# Patient Record
Sex: FEMALE | Race: WHITE
[De-identification: names, ages, dates, MRNs, and addresses within clinical notes are randomized per-mention and may not be internally consistent; named-entity substitution may affect disease eponyms.]

---

## 2019-07-16 ENCOUNTER — HOSPITAL ENCOUNTER (OUTPATIENT)
Dept: HOSPITAL 88 - OR | Age: 44
Discharge: HOME | End: 2019-07-16
Attending: INTERNAL MEDICINE
Payer: COMMERCIAL

## 2019-07-16 VITALS — SYSTOLIC BLOOD PRESSURE: 135 MMHG | DIASTOLIC BLOOD PRESSURE: 90 MMHG

## 2019-07-16 DIAGNOSIS — R74.0: ICD-10-CM

## 2019-07-16 DIAGNOSIS — Z87.891: ICD-10-CM

## 2019-07-16 DIAGNOSIS — Z88.2: ICD-10-CM

## 2019-07-16 DIAGNOSIS — E66.01: ICD-10-CM

## 2019-07-16 DIAGNOSIS — Z12.11: Primary | ICD-10-CM

## 2019-07-16 DIAGNOSIS — Z71.3: ICD-10-CM

## 2019-07-16 DIAGNOSIS — Z80.0: ICD-10-CM

## 2019-07-16 PROCEDURE — 81025 URINE PREGNANCY TEST: CPT

## 2019-07-16 PROCEDURE — 45378 DIAGNOSTIC COLONOSCOPY: CPT

## 2019-07-16 NOTE — XMS REPORT
[facility]

                             Created on: 2017



Aure Quinones

External Reference #: [id number]

: 1975

Sex: Female



Demographics







                          Address                   [address] [place] [address]  [address]

 

                          Home Phone                [phone]

 

                          Preferred Language        Unknown

 

                          Marital Status            Unknown

 

                          Cheondoism Affiliation     Unknown

 

                          Race                      Unknown

 

                          Ethnic Group              Non-





Author







                          Author                    Yoly Lanza

 

                          [facility]              eClinicalWorks

 

                          Address                   Unknown

 

                          Phone                     Unavailable







Care Team Providers







                    Care Team Member Name    Role                Phone

 

                    Yoly Lanza    CP                  Unavailable



                                                                



Allergies, Adverse Reactions, Alerts

          





                    Substance           Reaction            Event Type

 

                    sulfa               rash                Drug Allergy



                                                                                
       



Problems

          





             Problem Type    Condition    Code         Onset Dates    Condition Status

 

             Assessment    Other obesity due to excess calories    E66.09                    Active

 

             Problem      Elevated LFTs    R94.5                     Active

 

             Assessment    Acute pain of right shoulder    M25.511                   Active

 

             Assessment    Elevated BP without diagnosis of hypertension    R03.0                     Active

 

             Assessment    Body mass index (BMI) of 30.0-30.9 in adult    Z68.30                    Active

 

             Problem      Body mass index (BMI) of 30.0-30.9 in adult    Z68.30                    Active

 

             Problem      Mixed hyperlipidemia    E78.2                     Active

 

             Problem      Other obesity due to excess calories    E66.09                    Active

 

             Problem      ADHD (attention deficit hyperactivity disorder)    F90.9                     Active

 

             Problem      Hyperglycemia    R73.9                     Active

 

             Problem      Vitamin D deficiency    E55.9                     Active

 

             Problem      Depression    F32.9                     Active



                                                                                
                                                                                
                                    



Medications

          





        Medication    Code System    Code    Instructions    Start Date    End Date    Status    Dosage



 

           Naproxen    NDC        [address]-83    500 MG Orally every 12 hrs prn    2017    Aug

 10, 2017                 Active                    1 tablet

 

        Pristiq    NDC     [address]-48    50 MG Orally Once a day                    Active    1 tablet

 

        Lovaza    NDC     [address]-34    1 GM Orally Twice a day            Oct 05, 2017    Active    2 

capsules

 

        Zyrtec Allergy    NDC     [address]-64    10 MG Orally Once a day                    Active    1 tablet



 

        Vyvanse    NDC     [address]-10    10 MG Orally Once a day                    Active    1 capsule in

 the morning

 

          Fenofibrate    NDC       [address]-79    160 MG Orally Once a day    2017              Active

                                        1 tablet with a meal

 

             Scopolamine    NDC          [address]-40    1 MG/3DAYS Transdermal Q72 hours    2017

                    Oct 17, 2017        Active              1 patch to skin as needed

 

        Abilify    NDC     [address]-35    10 MG Orally Once a day                    Active    1 tablet

 

          Mirena    NDC       [address]-49    20 MCG/24HR Intrauterine     2016              Active 

                                        not defined



                                                                                
                                                                                
                



Vital Signs

          





                          Date/Time:                2017

 

                          BMI                       41.70 Index

 

                          Weight                    228 lbs

 

                          Height                    62 in

 

                          Cardiac Monitoring Heart Rate    65 /min

 

                          Blood Pressure Diastolic    86 mm Hg

 

                          Blood Pressure Systolic    142 mm Hg



                                                                              



Results

          No Known Results                                                      
             



Summary Purpose

          eClinicalWorks Submission

## 2019-07-16 NOTE — XMS REPORT
[facility]

                             Created on: 10/13/2016



Aure Quinones

External Reference #: [id number]

: 1975

Sex: Female



Demographics







                          Address                   [address] [place] [address]  [address]

 

                          Home Phone                [phone]

 

                          Preferred Language        Unknown

 

                          Marital Status            Unknown

 

                          Synagogue Affiliation     Unknown

 

                          Race                      Unknown

 

                          Ethnic Group              Non-





Author







                          Author                    Yoly Lanza

 

                          [facility]              eClinicalWorks

 

                          Address                   Unknown

 

                          Phone                     Unavailable







Care Team Providers







                    Care Team Member Name    Role                Phone

 

                    Yoly Lanza    CP                  Unavailable



                                                                



Allergies, Adverse Reactions, Alerts

          





                    Substance           Reaction            Event Type

 

                    sulfa               rash                Drug Allergy



                                                                    



Encounters

          





                    Encounter           Location            Date

 

                    Physical/FBW        [facility]    Aug 25,

 2016

 

                    1 month follow up     [facility]    Oct

 10, 2016



                                                                                
                 



Problems

          





             Problem Type    Condition    ICD-9 Code    Onset Dates    Condition Status

 

             Assessment    Morbid obesity due to excess calories    E66.01                    Active

 

             Assessment    Tendonitis of elbow, left    M77.8                     Active

 

             Assessment    BMI 40.0-44.9, adult    Z68.41                    Active

 

             Assessment    Mixed hyperlipidemia    E78.2                     Active

 

             Assessment    Vitamin D deficiency    E55.9                     Active

 

             Problem      Prediabetes    R73.09                    Active

 

             Problem      Hypothyroid    E03.9                     Active

 

             Problem      Mixed hyperlipidemia    E78.2                     Active

 

             Problem      Depression    F32.9                     Active

 

             Problem      ADHD (attention deficit hyperactivity disorder)    F90.9                     Active

 

             Problem      Hyperlipidemia    E78.5                     Active

 

             Problem      Vitamin D deficiency    E55.9                     Active



                                                                                
                                                                                
                                    



Medications

          





        Medication    Code System    Code    Instructions    Start Date    End Date    Status    Dosage



 

        Mirena    [facility]    [address]-64    20 MCG/24HR Intrauterine                     Active    Unknown



 

        V[facility]vanse    [facility]    [address]-10    10 MG Orally Once a day                    Active    1 capsule

 in the morning

 

          Lovaza    [facility]    [address]-40    1 GM Orally Twice a day              Oct 05, 2017    Active

                                        2 capsules

 

        Antara    [facility]    [address]-12    43 MG Orally Once a day                    Active    1 capsule

 with a meal

 

        Lexapro    [facility]    [address]-50    20 MG Orally Once a day                    Active    0.5 tablet



 

        Abilify    [facility]    [address]-54    10 MG Orally Once a day                    Active    1 tablet



 

                Mucinex DM Maximum Strength    [facility]        [address]-02     MG Orally Twice a 

day                                             Active          1 tablet as needed

 

        Mucinex    [facility]    [address]-23    600 MG Orally every 12 hrs                    Active    1 tablet

 as needed

 

        Zyrtec Allergy    [facility]    [address]-96    10 MG Orally Once a day                    Active    

1 tablet

 

                Vitamin D (Ergocalciferol)    [facility]        [address]-81    73648 UNIT Orally once per 

week            Aug 25, 2016    2017    Active          1 capsule



                                                                                
                                                                                
                



Social History

          





                    Social History Element    Qualifiers          Date Reported

 

                    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

 

                    children            .  1                Oct 10, 2016

 

                    Ethnicity           .  Status , Is english your primary language? Yes    Oct 10, 2016



 

                    Tobacco Use:        .  Are you a: never smoker    Oct 10, 2016

 

                    Flu Vaccine:        .  never, Refuse    Oct 10, 2016

 

                    Use of recreational / street drugs?    .  Answer: No       Oct 10, 2016

 

                    Do you have pets?    .  Status: Yes, Type: cat(s)    Oct 10, 2016

 

                    Where or with whom do you live ?    .  family           Oct 10, 2016

 

                    Marital Status:     .  Osmar    Oct 10, 2016

 

                    Caffeine intake?    .  Status: Yes, What type: Tea    Oct 10, 2016

 

                    Do you exercise?    .  Answer: Yes, Type: walking    Oct 10, 2016

 

                    Depression Screening:    .  positive         Oct 10, 2016

 

                    Smoke Exposure:     .  Second Hand Smoke Exposure: No    Oct 10, 2016

 

                    Do you drink alcohol?    .  Status: No       Oct 10, 2016



                                                                                
                                                                                
                                                                  



Vital Signs

          





                          Date/Time:                Oct 10, 2016

 

                          Weight                    235 lbs

 

                          Height                    62 in

 

                          Cardiac Monitoring Heart Rate    68 /min

 

                          Blood Pressure Diastolic    84 mm Hg

 

                          Blood Pressure Systolic    122 mm Hg



                                                                    



Summary Purpose

          eClinicalWorks Submission

## 2019-07-16 NOTE — XMS REPORT
[facility]

                             Created on: 2019



Aure Quinones

External Reference #: [id number]

: 1975

Sex: Female



Demographics







                          Address                   [address] [place] [address]  [address]

 

                          Home Phone                [phone]

 

                          Preferred Language        Unknown

 

                          Marital Status            Unknown

 

                          Sabianism Affiliation     Unknown

 

                          Race                      Unknown

 

                          Ethnic Group              UNK





Author







                          Author                    Yoly Lanza

 

                          [facility]              eClinicalWorks

 

                          Address                   Unknown

 

                          Phone                     Unavailable







Care Team Providers







                    Care Team Member Name    Role                Phone

 

                    Yoly Lanza    CP                  Unavailable



                                                                



Allergies, Adverse Reactions, Alerts

          





                    Substance           Reaction            Event Type

 

                    sulfa               hives               Drug Allergy



                                                                                
       



Problems

          





             Problem Type    Condition    Code         Onset Dates    Condition Status

 

             Problem      Elevated LFTs    R94.5                     Active

 

             Problem      Hyperglycemia    R73.9                     Active

 

             Problem      Other obesity due to excess calories    E66.09                    Active

 

             Problem      Body mass index (BMI) of 30.0-30.9 in adult    Z68.30                    Active

 

             Problem      BMI 45.0-49.9, adult    Z68.42                    Active

 

             Problem      Depression    F32.9                     Active

 

             Problem      ADHD (attention deficit hyperactivity disorder)    F90.9                     Active

 

             Problem      Mixed hyperlipidemia    E78.2                     Active

 

             Problem      Vitamin D deficiency    E55.9                     Active

 

             Assessment    Hyperglycemia    R73.9                     Active

 

             Assessment    Elevated LFTs    R94.5                     Active

 

             Assessment    Other obesity due to excess calories    E66.09                    Active

 

             Assessment    Snoring      R06.83                    Active

 

             Assessment    Vitamin D deficiency    E55.9                     Active



                                                                                
                                                                                
                                                        



Medications

          





        Medication    Code System    Code    Instructions    Start Date    End Date    Status    Dosage



 

        Naproxen    NDC     00641273896    500 mg Oral as needed (prn)                    Active    TAKE 1 TABLET

 BY MOUTH TWICE A DAY WITH MEALS

 

        Abilify    NDC     11120152622    10 MG Orally Once a day                    Active    1 tablet

 

             Vitamin D (Ergocalciferol)    NDC          02249938909    00232 UNIT Orally once per week    Feb

 19, 2019           Aug 18, 2019        Active              1 capsule

 

        Mirena    NDC     19792126237    20 MCG/24HR Intrauterine     2016            Active    not

 defined

 

          Fenofibrate    NDC       87188667730    160 MG Orally Once a day    2017              Active

                                        1 tablet with a meal

 

        Lovaza    ND     58409417709    1 GM Orally Twice a day            2020    Active    2 capsules



 

        Trintellix    NDC     96307430605    10 MG Orally Once a day                    Active    1 tablet

 

        Zyrtec Allergy    ND     87206395512    10 MG Orally Once a day                    Active    1 tablet





                                                                                
                                                                                
      



Vital Signs

          





                          Date/Time:                2019

 

                          BMI                       45.72 Index

 

                          Weight                    250 lbs

 

                          Height                    62 in

 

                          Cardiac Monitoring Heart Rate    70 /min

 

                          Blood Pressure Diastolic    80 mm Hg

 

                          Blood Pressure Systolic    122 mm Hg



                                                                              



Results

          No Known Results                                                      
             



Summary Purpose

          eClinicalWorks Submission

## 2019-07-16 NOTE — XMS REPORT
[facility]

                             Created on: 2019



Aure Quinones

External Reference #: [id number]

: 1975

Sex: Female



Demographics







                          Address                   [address] [place] [address]  [address]

 

                          Home Phone                [phone]

 

                          Preferred Language        Unknown

 

                          Marital Status            Unknown

 

                          Church Affiliation     Unknown

 

                          Race                      Unknown

 

                          Ethnic Group              UNK





Author







                          Author                    Yoly Lanza

 

                          Organization              [employer]

 

                          Address                   Unknown

 

                          Phone                     Unavailable







Care Team Providers







                    Care Team Member Name    Role                Phone

 

                    Yoly Lanza    CP                  Unavailable



                                                                



Allergies

          No Known Allergies                                                    
                                   



Problems

          





             Problem Type    Condition    Code         Onset Dates    Condition Status

 

             Problem      Elevated LFTs    R94.5                     Active

 

             Problem      ADHD (attention deficit hyperactivity disorder)    F90.9                     Active

 

             Problem      Hyperglycemia    R73.9                     Active

 

             Assessment    Elevated LFTs    R94.5                     Active

 

             Problem      BMI 45.0-49.9, adult    Z68.42                    Active

 

             Problem      Other obesity due to excess calories    E66.09                    Active

 

             Problem      Morbid obesity    E66.01                    Active

 

             Problem      Vitamin D deficiency    E55.9                     Active

 

             Problem      Depression    F32.9                     Active

 

             Problem      Body mass index (BMI) of 30.0-30.9 in adult    Z68.30                    Active

 

             Problem      Mixed hyperlipidemia    E78.2                     Active



                                                                                
                                                                                
                          



Medications

          No Known Medications                                                  
                           



Results

          No Known Results                                                      
             



Summary Purpose

          eClinicalWorks Submission

## 2019-07-16 NOTE — XMS REPORT
[facility]

                             Created on: 2017



Aure Quinones

External Reference #: [id number]

: 1975

Sex: Female



Demographics







                          Address                   [address] [place] [address]  [address]

 

                          Home Phone                [phone]

 

                          Preferred Language        Unknown

 

                          Marital Status            Unknown

 

                          Muslim Affiliation     Unknown

 

                          Race                      Unknown

 

                          Ethnic Group              Non-





Author







                          Author                    Yoly Lanza

 

                          [facility]              eClinicalWorks

 

                          Address                   Unknown

 

                          Phone                     Unavailable







Care Team Providers







                    Care Team Member Name    Role                Phone

 

                    Yloy Lanza    CP                  Unavailable



                                            



Encounters

          





                    Encounter           Location            Date

 

                    Unknown             [facility]    2016



 

                    3 MONTH FOLLOW UP    [facility]    [place]

 10, 2017

 

                    Test results        [facility]    2017

 

                    Physical/FBW        [facility]    Aug 25,

 2016

 

                    1 month follow up     [facility]    Oct

 10, 2016

 

                    Unknown             [facility]    2016





                                                                                
                                                         



Problems

          





             Problem Type    Condition    ICD-9 Code    Onset Dates    Condition Status

 

             Problem      Prediabetes    R73.09                    Active

 

             Problem      Hypothyroid    E03.9                     Active

 

             Problem      Mixed hyperlipidemia    E78.2                     Active

 

             Problem      ADHD (attention deficit hyperactivity disorder)    F90.9                     Active

 

             Problem      Vitamin D deficiency    E55.9                     Active

 

             Problem      Depression    F32.9                     Active



                                                                                
                                                         



Medications

          





        Medication    Code System    Code    Instructions    Start Date    End Date    Status    Dosage



 

           [facility]    [address]-33    145 MG Orally Once a day    2017      

                          Active                    1 tablet



                                                                                
       



Social History

          





                    Social History Element    Qualifiers          Date Reported

 

                    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

 

                    children            .  1                [place] 10, 2017

 

                    Ethnicity           .  Status , Is english your primary language? Yes    [place] 10, 2017



 

                    Tobacco Use:        .  Are you a: never smoker    [place] 10, 2017

 

                    Flu Vaccine:        .  never, Refuse    [place] 10, 2017

 

                    Use of recreational / street drugs?    .  Answer: No       [place] 10, 2017

 

                    Do you have pets?    .  Status: Yes, Type: cat(s)    [place] 10, 2017

 

                    Where or with whom do you live ?    .  family           [place] 10, 2017

 

                    Marital Status:     .  Osmar    [place] 10, 2017

 

                    Caffeine intake?    .  Status: Yes, What type: Tea    [place] 10, 2017

 

                    Do you exercise?    .  Answer: Yes, Type: walking    [place] 10, 2017

 

                    Smoke Exposure:     .  Second Hand Smoke Exposure: No    [place] 10, 2017

 

                    Do you drink alcohol?    .  Status: No       [place] 10, 2017



                                                                                
                                                                                
                          



Summary Purpose

          eClinicalWorks Submission

## 2019-07-16 NOTE — XMS REPORT
[facility]

                             Created on: 2016



Joni Aure

External Reference #: [id number]

: 1975

Sex: Female



Demographics







                          Address                   [address] [place] [address]  [address]

 

                          Home Phone                [phone]

 

                          Preferred Language        Unknown

 

                          Marital Status            Unknown

 

                          Advent Affiliation     Unknown

 

                          Race                      Unknown

 

                          Ethnic Group              Non-





Author







                          Author                    Yoly Lanza

 

                          [facility]              eClinicalWorks

 

                          Address                   Unknown

 

                          Phone                     Unavailable







Care Team Providers







                    Care Team Member Name    Role                Phone

 

                    Pak Yoly Delgado    CP                  Unavailable



                                            



Encounters

          





                    Encounter           Location            Date

 

                    Physical/FBW        [facility]    Aug 25,

 2016

 

                    1 month follow up     [facility]    Oct

 10, 2016

 

                    Unknown             [facility]    2016





                                                                                
                           



Problems

          





             Problem Type    Condition    ICD-9 Code    Onset Dates    Condition Status

 

             Assessment    Mixed hyperlipidemia    E78.2                     Active

 

             Problem      Prediabetes    R73.09                    Active

 

             Problem      Hypothyroid    E03.9                     Active

 

             Problem      Mixed hyperlipidemia    E78.2                     Active

 

             Problem      Depression    F32.9                     Active

 

             Problem      ADHD (attention deficit hyperactivity disorder)    F90.9                     Active

 

             Problem      Hyperlipidemia    E78.5                     Active

 

             Problem      Vitamin D deficiency    E55.9                     Active



                                                                                
                                                                             



Medications

          





        Medication    Code System    Code    Instructions    Start Date    End Date    Status    Dosage



 

           Fenofibrate    "[employer]SPAN    [address]-47    145 MG Orally Once a day    2016      

                          Active                    1 tablet

 

        Antara    [facility]    [address]-44    43 MG Orally Once a day                    Inactive    1 capsule

 with a meal



                                                                                
                 



Social History

          





                    Social History Element    Qualifiers          Date Reported

 

                    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

 

                    children            .  1                Oct 10, 2016

 

                    Ethnicity           .  Status , Is english your primary language? Yes    Oct 10, 2016



 

                    Tobacco Use:        .  Are you a: never smoker    Oct 10, 2016

 

                    Flu Vaccine:        .  never, Refuse    Oct 10, 2016

 

                    Use of recreational / street drugs?    .  Answer: No       Oct 10, 2016

 

                    Do you have pets?    .  Status: Yes, Type: cat(s)    Oct 10, 2016

 

                    Where or with whom do you live ?    .  family           Oct 10, 2016

 

                    Marital Status:     .  Osmar    Oct 10, 2016

 

                    Caffeine intake?    .  Status: Yes, What type: Tea    Oct 10, 2016

 

                    Do you exercise?    .  Answer: Yes, Type: walking    Oct 10, 2016

 

                    Depression Screening:    .  positive         Oct 10, 2016

 

                    Smoke Exposure:     .  Second Hand Smoke Exposure: No    Oct 10, 2016

 

                    Do you drink alcohol?    .  Status: No       Oct 10, 2016



                                                                                
                                                                                
                                    



Summary Purpose

          eClinicalWorks Submission

## 2019-07-16 NOTE — XMS REPORT
[facility]

                             Created on: 2017



Aure Quinones

External Reference #: [id number]

: 1975

Sex: Female



Demographics







                          Address                   [address] [place] [address]  [address]

 

                          Home Phone                [phone]

 

                          Preferred Language        Unknown

 

                          Marital Status            Unknown

 

                          Religion Affiliation     Unknown

 

                          Race                      Unknown

 

                          Ethnic Group              Non-





Author







                          Author                    Akanksha Gordon

 

                          [facility]              eClinicalWorks

 

                          Address                   Unknown

 

                          Phone                     Unavailable







Care Team Providers







                    Care Team Member Name    Role                Phone

 

                    Akanksha Gordon    CP                  Unavailable



                                                                



Allergies, Adverse Reactions, Alerts

          





                    Substance           Reaction            Event Type

 

                    sulfa               rash                Drug Allergy



                                                                                
       



Problems

          





             Problem Type    Condition    Code         Onset Dates    Condition Status

 

             Assessment    Depression    F32.9                     Active

 

             Assessment    Vitamin D deficiency    E55.9                     Active

 

             Assessment    ADHD (attention deficit hyperactivity disorder)    F90.9                     Active

 

             Assessment    Motion sickness, subsequent encounter    T75.3XXD                  Active

 

             Problem      Hypothyroid    E03.9                     Active

 

             Problem      Vitamin D deficiency    E55.9                     Active

 

             Problem      Mixed hyperlipidemia    E78.2                     Active

 

             Assessment    Mixed hyperlipidemia    E78.2                     Active

 

             Assessment    Hypothyroid    E03.9                     Active

 

             Problem      Depression    F32.9                     Active

 

             Problem      ADHD (attention deficit hyperactivity disorder)    F90.9                     Active



                                                                                
                                                                                
                          



Medications

          





        Medication    Code System    Code    Instructions    Start Date    End Date    Status    Dosage



 

        Zyrtec Allergy    NDC     [address]-91    10 MG Orally Once a day                    Active    1 tablet



 

          Fenofibrate    NDC       [address]-14    160 MG Orally Once a day    2017              Active

                                        1 tablet with a meal

 

             Scopolamine    NDC          [address]-26    1 MG/3DAYS Transdermal Q72 hours           Active              1 patch to skin as needed

 

        Vyvanse    NDC     [address]-10    10 MG Orally Once a day                    Active    1 capsule in

 the morning

 

        Mirena    NDC     [address]-72    20 MCG/24HR Intrauterine                     Active    not defined



 

                Vitamin D (Ergocalciferol)    NDC             [address]-21    89507 UNIT Orally once per week 

                Aug 25, 2016    2017    Active          1 capsule

 

        Pristiq    NDC     [address]-48    50 MG Orally Once a day                    Active    1 tablet

 

        Lovaza    NDC     [address]-58    1 GM Orally Twice a day            Oct 05, 2017    Active    2 

capsules

 

        Abilify    NDC     [address]-97    10 MG Orally Once a day                    Active    1 tablet



                                                                                
                                                                                
                



Vital Signs

          





                          Date/Time:                2017

 

                          BMI                       42.06 Index

 

                          Weight                    230 lbs

 

                          Height                    62 in

 

                          Cardiac Monitoring Heart Rate    76 /min

 

                          Blood Pressure Diastolic    70 mm Hg

 

                          Blood Pressure Systolic    120 mm Hg



                                                                              



Results

          No Known Results                                                      
             



Summary Purpose

          eClinicalWorks Submission

## 2019-07-16 NOTE — XMS REPORT
[facility]

                             Created on: 11/10/2016



Aure Quinones

External Reference #: [id number]

: 1975

Sex: Female



Demographics







                          Address                   [address] [place] [address]  [address]

 

                          Home Phone                [phone]

 

                          Preferred Language        Unknown

 

                          Marital Status            Unknown

 

                          Sabianist Affiliation     Unknown

 

                          Race                      Unknown

 

                          Ethnic Group              Non-





Author







                          Author                    Yoly Lanza

 

                          [facility]              eClinicalWorks

 

                          Address                   Unknown

 

                          Phone                     Unavailable







Care Team Providers







                    Care Team Member Name    Role                Phone

 

                    Yoly Lanza    CP                  Unavailable



                                            



Encounters

          





                    Encounter           Location            Date

 

                    Unknown             [facility]    2016



 

                    Physical/FBW        [facility]    Aug 25,

 2016

 

                    1 month follow up     [facility]    Oct

 10, 2016

 

                    Unknown             [facility]    2016





                                                                                
                                     



Problems

          





             Problem Type    Condition    ICD-9 Code    Onset Dates    Condition Status

 

             Problem      Prediabetes    R73.09                    Active

 

             Problem      Hypothyroid    E03.9                     Active

 

             Problem      Mixed hyperlipidemia    E78.2                     Active

 

             Problem      Depression    F32.9                     Active

 

             Problem      ADHD (attention deficit hyperactivity disorder)    F90.9                     Active

 

             Problem      Hyperlipidemia    E78.5                     Active

 

             Problem      Vitamin D deficiency    E55.9                     Active



                                                                                
                                                                   



Medications

          





        Medication    Code System    Code    Instructions    Start Date    End Date    Status    Dosage



 

          Trilipix    MED[facility]    [address]-99    135 MG Orally Once a day    Nov 10, 2016              Active

                                        1 capsule

 

           Fenofibrate    [facility]    [address]-43    145 MG Orally Once a day    2016      

                          Inactive                  1 tablet



                                                                                
                 



Social History

          





                    Social History Element    Qualifiers          Date Reported

 

                    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

 

                    children            .  1                Oct 10, 2016

 

                    Ethnicity           .  Status , Is english your primary language? Yes    Oct 10, 2016



 

                    Tobacco Use:        .  Are you a: never smoker    Oct 10, 2016

 

                    Flu Vaccine:        .  never, Refuse    Oct 10, 2016

 

                    Use of recreational / street drugs?    .  Answer: No       Oct 10, 2016

 

                    Do you have pets?    .  Status: Yes, Type: cat(s)    Oct 10, 2016

 

                    Where or with whom do you live ?    .  family           Oct 10, 2016

 

                    Marital Status:     .  Osmar    Oct 10, 2016

 

                    Caffeine intake?    .  Status: Yes, What type: Tea    Oct 10, 2016

 

                    Do you exercise?    .  Answer: Yes, Type: walking    Oct 10, 2016

 

                    Depression Screening:    .  positive         Oct 10, 2016

 

                    Smoke Exposure:     .  Second Hand Smoke Exposure: No    Oct 10, 2016

 

                    Do you drink alcohol?    .  Status: No       Oct 10, 2016



                                                                                
                                                                                
                                    



Summary Purpose

          eClinicalWorks Submission

## 2019-07-16 NOTE — OPERATIVE REPORT
DATE OF PROCEDURE:  

 

SURGEON:  Virgilio Mcmahon MD

 

PROCEDURE:  Colonoscopy.

 

INDICATIONS:  The patient with a family history of colon cancer, here for colon cancer

screening. 

 

DESCRIPTION OF PROCEDURE:  After informed and written consent, premedications with

monitored anesthesia care, standard adult video colonoscope was introduced into the

rectum and all the way into the cecum and into the terminal ileum.  The terminal ileum,

cecum, appendiceal orifice, and ileocecal valve all appeared to be normal.  Ascending

colon, transverse, descending, sigmoid, and rectum appeared to be unremarkable.

Retroflexion of the rectum was also normal. 

 

IMPRESSION:  Normal colonoscopy.

 

RECOMMENDATIONS:  Repeat colonoscopy in 5 years.  Followup with our office as needed.

 

 

 

 

______________________________

Virgilio Mcmahon MD SR/MODL

D:  07/16/2019 07:35:49

T:  07/16/2019 10:43:38

Job #:  [id number]

 

cc:            Yoly Phillips DO

## 2019-07-16 NOTE — XMS REPORT
[facility]

                             Created on: 2017



Aure Quinones

External Reference #: [id number]

: 1975

Sex: Female



Demographics







                          Address                   [address] [place] [address]  [id number]

 

                          Home Phone                [phone]

 

                          Preferred Language        Unknown

 

                          Marital Status            Unknown

 

                          [employer] Affiliation     Unknown

 

                          Race                      Unknown

 

                          Ethnic Group              Non-





Author







                          Author                    Akanksha Gordon

 

                          Organization              [employer]

 

                          Address                   Unknown

 

                          Phone                     Unavailable







Care Team Providers







                    Care Team Member Name    Role                Phone

 

                    Akanksha Gordon    CP                  Unavailable



                                                                



Allergies, Adverse Reactions, Alerts

          





                    Substance           Reaction            Event Type

 

                    sulfa               rash                Drug Allergy



                                                                                
       



Problems

          





             Problem Type    Condition    Code         Onset Dates    Condition Status

 

             Assessment    Mixed hyperlipidemia    E78.2                     Active

 

             Assessment    Hyperglycemia    R73.9                     Active

 

             Assessment    Vitamin D deficiency    E55.9                     Active

 

             Assessment    Motion sickness, subsequent encounter    T75.3XXD                  Active

 

             Assessment    Elevated BP without diagnosis of hypertension    R03.0                     Active

 

             Problem      Vitamin D deficiency    E55.9                     Active

 

             Problem      Depression    F32.9                     Active

 

             Problem      Mixed hyperlipidemia    E78.2                     Active

 

             Problem      Elevated LFTs    R94.5                     Active

 

             Assessment    Elevated LFTs    R94.5                     Active

 

             Problem      ADHD (attention deficit hyperactivity disorder)    F90.9                     Active

 

             Problem      Hyperglycemia    R73.9                     Active



                                                                                
                                                                                
                                    



Medications

          





        Medication    Code System    Code    Instructions    Start Date    End Date    Status    Dosage



 

        Abilify    NDC     [address]-66    10 MG Orally Once a day                    Active    1 tablet

 

          Fenofibrate    NDC       [address]-05    160 MG Orally Once a day    2017              Active

                                        1 tablet with a meal

 

        Vyvanse    NDC     [address]-10    10 MG Orally Once a day                    Active    1 capsule in

 the morning

 

        Zyrtec Allergy    NDC     [address]-36    10 MG Orally Once a day                    Active    1 tablet



 

        Lovaza    NDC     [address]-77    1 GM Orally Twice a day            Oct 05, 2017    Active    2 

capsules

 

        Mirena    NDC     [address]-45    20 MCG/24HR Intrauterine                     Active    not defined



 

        Pristiq    NDC     [address]-75    50 MG Orally Once a day                    Active    1 tablet

 

             Scopolamine    NDC          [address]-01    1 MG/3DAYS Transdermal Q72 hours    2017

                    Oct 17, 2017        Active              1 patch to skin as needed



                                                                                
                                                                                
      



Vital Signs

          





                          Date/Time:                2017

 

                          BMI                       41.70 Index

 

                          Weight                    228 lbs

 

                          Height                    62 in

 

                          Cardiac Monitoring Heart Rate    102 /min

 

                          Blood Pressure Diastolic    90 mm Hg

 

                          Blood Pressure Systolic    140 mm Hg



                                                                              



Results

          No Known Results                                                      
             



Summary Purpose

          [employer] Submission

## 2019-07-16 NOTE — XMS REPORT
Continuity of Care Document

                             Created on: 2019



MIRI FAJARDO

External Reference #: [id number]

: 1975

Sex: Female



Demographics







                          Address                   [address] [place] [address]  [id number]

 

                          Home Phone                [phone]

 

                          Preferred Language        English

 

                          Marital Status            Unknown

 

                          Church Affiliation     Unknown

 

                          Race                      Unknown

 

                          Ethnic Group              Unknown





Author







                          Author                    [facility]

 

                          Organization              [facility]

 

                          Address                   Unknown

 

                          Phone                     Unavailable







Care Team Providers







                    Care Team Member Name    Role                Phone

 

                    [employer]    Unavailable         Unavailable



                                    



Problems

                    





                    Problem                            Status                            Onset Date     

                          Classification                            Date Reported       

                          Comments                            Source                    

 

                    Depression                            Active                                        

                          Problem                            2019                        

                                                      [facility]                

    

 

                    Vitamin D deficiency                            Active                              

                          Problem                            2019              

                                                      [facility]      

              

 

                    ADHD                            Active                                              

                    Problem                            2019                                

                                        [facility]                    

 

                          Motion sickness, subsequent encounter                            Active           

                                                Diagnosis                            2017

                                                        [facility]                    

 

                    Hypothyroid                            Active                                       

                          Problem                            2017                       

                                                      [facility]               

     

 

                    Mixed hyperlipidemia                            Active                              

                          Problem                            2019              

                                                      [facility]      

              

 

                          Other obesity due to excess calories                            Active            

                                                Problem                            2019

                                                        [facility]                    

 

                    Elevated LFTs                            Active                                     

                          Problem                            2019                     

                                                      [facility]             

       

 

                          Acute pain of right shoulder                            Active                    

                                                Diagnosis                            2017    

                                                      [facility]

                    

 

                          Elevated BP without diagnosis of hypertension                            Active   

                                                      Diagnosis                       

                    2017                                                        [facility]                    

 

                          Body mass index of 30.0-30.9 in adult                            Active           

                                                Problem                            2019

                                                        [facility]                    

 

                    Hyperglycemia                            Active                                     

                          Problem                            2019                     

                                                      [facility]             

       

 

                    BMI 45.0-49.9, adult                            Active                              

                          Problem                            2019              

                                                      [facility]      

              

 

                    Morbid obesity                            Active                                    

                          Problem                            2019                    

                                                      [facility]            

        

 

                    Snoring                            Active                                           

                          Diagnosis                            2019                         

                                                      [facility]                 

   

 

                                        Routine general medical examination at a health care facility                   

                    Active                                                        Diagnosis       

                     2019                                                        

[facility]                    

 

                    Prediabetes                            Active                                       

                          Problem                            2017                       

                                                      [facility]               

     

 

                    Hyperlipidemia                            Active                                    

                          Problem                            2016                    

                                                      [facility]            

        

 

                          Morbid obesity due to excess calories                            Active           

                                                Diagnosis                            10/14/2016

                                                        [facility]                    

 

                          Tendonitis of elbow, left                            Active                       

                                                Diagnosis                            10/14/2016       

                                                      [facility]

                    

 

                    Severe obesity                            Active                                    

                          Diagnosis                            2016                  

                                                      [facility]          

          



                                                                                
                                                                                
                                                                                
                                                                                
                                                                    



Medications

                    





                    Medication                            Details                            Route      

                          Status                            Patient Instructions         

                          Ordering Provider                            Order Date           

                                        Source                    

 

                    Lovaza                            2 capsules                            Orally      

                          Active                            1 GM Orally Twice a day      

                          Pak Delgado                            2020             

                                        [facility]                    

 

                          Vitamin D (Ergocalciferol)                            1 capsule                   

                    Orally                            Active                            44082 UNIT

 Orally once per week                            Pak Delgado                      

                          2019                            [facility]    

                

 

                    Lovaza                            2 capsules                            Orally      

                          Active                            1 GM Orally Twice a day      

                          Pak Delgado                            10/05/2017             

                                        [facility]                    

 

                    Naproxen                            1 tablet                            Orally      

                          Active                            500 MG Orally every 12 hrs prn

                            Pak Delgado                            2017       

                                        [facility]                    

 

                          Scopolamine                            1 patch to skin as needed                  

                    Transdermal                            Active                            1 MG/3DAYS

 Transdermal Q72 hours                            Pak Delgado                     

                          2017                            [facility]   

                 

 

                          Fenofibrate                            1 tablet with a meal                       

                    Orally                            Active                            160 MG Orally 

Once a day                            Pak Delgado                            2017

                                        [facility]                  

  

 

                          Fenofibrate                            1 tablet with a meal                       

                    Orally                            Active                            160 MG Orally 

Once a day                            Pak Delgado                            2017

                                        [facility]                  

  

 

                    Fenofibrate                            1 tablet                            Orally   

                          No Longer Active                            145 MG Orally Once

 a day                            Pak Delgado                            2017 

                                        [facility]                   

 

 

                    Trilipix                            1 capsule                            Orally     

                          Active                            135 MG Orally Once a day    

                          Pak Delgado                            11/10/2016           

                                        [facility]                    

 

                    Fenofibrate                            1 tablet                            Orally   

                          No Longer Active                            145 MG Orally Once

 a day                            Pak Delgado                            2016 

                                        [facility]                   

 

 

                    Mirena                            not defined                            Intrauterine

                            Active                            20 MCG/24HR Intrauterine

                            Pak Delgado                            2016       

                                        [facility]                    

 

                    Mirena                            not defined                            Intrauterine

                            Active                            20 MCG/24HR Intrauterine

                            Pak Delgado                            2016       

                                        [facility]                    

 

                          Vitamin D (Ergocalciferol)                            1 capsule                   

                    Orally                            Active                            72525 UNIT

 Orally once per week                            Evan                            2016

                                        [facility]                  

  

 

                    Zyrtec Allergy                            1 tablet                            Orally

                            Active                            10 MG Orally Once a day

                            Pak Delgado                                             

                                        [facility]                    

 

                          Vyvanse                            1 capsule in the morning                       

                    Orally                            Active                            10 MG Orally Once

 a day                            Pak Delgado                                       

                                        [facility]                    

 

                    Mirena                            not defined                            Intrauterine

                            Active                            20 MCG/24HR Intrauterine

                            Evan                                                  

                                        [facility]                    

 

                    Pristiq                            1 tablet                            Orally       

                          Active                            50 MG Orally Once a day       

                          Pak Delgado                                                    

                                        [facility]                    

 

                    Abilify                            1 tablet                            Orally       

                          Active                            10 MG Orally Once a day       

                          Pasha Delgado                                                    

                                        [facility]                    

 

                    Trintellix                            1 tablet                            Orally    

                          Active                            10 MG Orally Once a day    

                          P[facility] Delgado                                                 

                                        [facility]                    

 

                    Abilify                            1 tablet                            Orally       

                          Active                            10 MG Orally Once a day       

                          [facility]                    

 

                          Naproxen                            TAKE 1 TABLET BY MOUTH TWICE A DAY WITH MEALS 

                           Oral                            Active                    

                          500 mg Oral as needed (prn)                            [facility]  

                  

 

                    Zyrtec Allergy                            1 tablet                            Orally

                            Active                            10 MG Orally Once a day

                            [facility]                    

 

                    Pristiq                            1 tablet                            Orally       

                          Active                            50 MG Orally Once a day       

                          [facility]                    

 

                          Vyvanse                            1 capsule in the morning                       

                    Orally                            Active                            10 MG Orally Once

 a day                            [facility]                    

 

                    Antara                            1 capsule with a meal                            Orally

                            No Longer Active                            43 MG Orally 

Once a day                            [facility]                    

 

                    Lexapro                            0.5 tablet                            Orally     

                          Active                            20 MG Orally Once a day     

                          [facility]                    

 

                          Mucinex DM Maximum Strength                            1 tablet as needed         

                    Orally                            Active                            

 MG Orally Twice a day                            [facility]      

              

 

                    Mucinex                            1 tablet as needed                            Orally

                            No Longer Active                            600 MG Orally

 every 12 hrs                            [facility]                    

 

                    Lovaza                            2 capsules                            Orally      

                          Active                            1 GM Orally Twice a day      

                          [facility]                                                   

                                        [facility]                    



                                                                                
                                                                                
                                                                                
                                                                                
                                                                                
                                                                                
                                              



Allergies, Adverse Reactions, Alerts

                    





                    Substance                            Category                            Reaction   

                          Severity                            Reaction type           

                          Status                            Date Reported                     

                          Comments                            Source                    

 

                    sulfa                            Adverse Reaction                            hives  

                                                      Adverse Reaction               

                    Active                            2019                              

                                        [facility]                    





                                                        



Immunizations

                    





                    Immunization                            Date Given                            Site  

                          Status                            Last Updated             

                          Comments                            Source                    

 

                          FLU 18 YRS & UP [address]                            2016                       

                                                completed                                             

                                                      [facility]         

           



                                             



Results







                                        No Data Provided for This Section



                    



Pathology Reports







                                        No Data Provided for This Section                    



                            



Diagnostic Reports

            





                                        No Data Provided for This Section                    



                                                            



Consultation Notes

                    





                                        No Data Provided for This Section                    



                                                            



Discharge Summaries

                    





                                        No Data Provided for This Section                    



                                                            



History and Physicals

                    





                                        No Data Provided for This Section                    



                                                                



Vital Signs

                     





                    Vital Sign                            Value                            Date         

                          Comments                            Source                    

 

                    Weight                            230                             2019        

                                                      [facility]

                    

 

                    Height                            62                             2019         

                                                      [facility] 

                   

 

                    Heart Rate                            68                             2019     

                                                      [facility]

                    

 

                    Diastolic (mm Hg)                            80                             2019

                                                        [facility]                    

 

                    Systolic (mm Hg)                            126                             2019

                                                        [facility] [facility]                    

 

                    Weight                            250                             2019        

                                                      [facility]

                    

 

                    Height                            62                             2019         

                                                      [facility] 

                   

 

                    Heart Rate                            70                             2019     

                                                      [facility]

                    

 

                    Diastolic (mm Hg)                            80                             2019

                                                        [facility]                    

 

                    Systolic (mm Hg)                            122                             2019

                                                        [facility]                    

 

                    Weight                            252                             2019        

                                                      [facility]

                    

 

                    Height                            62                             2019         

                                                      [facility] 

                   

 

                    Heart Rate                            68                             2019     

                                                      [facility]

                    

 

                    Diastolic (mm Hg)                            80                             2019

                                                        [facility]                    

 

                    Systolic (mm Hg)                            124                             2019

                                                        [facility]                    

 

                    Weight                            228                             2017        

                                                      [facility]

                    

 

                    Height                            62                             2017         

                                                      [facility] 

                   

 

                    Heart Rate                            65                             2017     

                                                      [facility]

                    

 

                    Diastolic (mm Hg)                            86                             2017

                                                        [facility]                    

 

                    Systolic (mm Hg)                            142                             2017

                                                        [facility]                    

 

                    Weight                            228                             2017        

                                                      [facility]

                    

 

                    Height                            62                             2017         

                                                      [facility] 

                   

 

                    Heart Rate                            102                             2017    

                                                      [facility]

                    

 

                    Diastolic (mm Hg)                            90                             2017

                                                        [facility]                    

 

                    Systolic (mm Hg)                            140                             2017

                                                        [facility]                    

 

                    Weight                            230                             2017        

                                                      [facility]

                    

 

                    Height                            62                             2017         

                                                      [facility] 

                   

 

                    Heart Rate                            76                             2017     

                                                      [facility]

                    

 

                    Diastolic (mm Hg)                            70                             2017

                                                        [facility]                    

 

                    Systolic (mm Hg)                            120                             2017

                                                        [facility]                    

 

                    Weight                            230                             01/10/2017        

                                                      [facility]

                    

 

                    Height                            62                             01/10/2017         

                                                      [facility] 

                   

 

                    Heart Rate                            63                             01/10/2017     

                                                      [facility]

                    

 

                    Diastolic (mm Hg)                            84                             01/10/2017

                                                        [facility]                    

 

                    Systolic (mm Hg)                            118                             01/10/2017

                                                        [facility]                    

 

                    Weight                            235                             10/10/2016        

                                                      [facility]

                    

 

                    Height                            62                             10/10/2016         

                                                      [facility] 

                   

 

                    Heart Rate                            68                             10/10/2016     

                                                      [facility]

                    

 

                    Diastolic (mm Hg)                            84                             10/10/2016

                                                        [facility]                    

 

                    Systolic (mm Hg)                            122                             10/10/2016

                                                        [facility]                    

 

                    Weight                            235                             2016        

                                                      [facility]

                    

 

                    Height                            62                             2016         

                                                      [facility] 

                   

 

                    Heart Rate                            74                             2016     

                                                      [facility]

                    

 

                    Diastolic (mm Hg)                            82                             2016

                                                        [facility]                    

 

                    Systolic (mm Hg)                            129                             2016

                                                        [facility]                    



                                                                                
                                                                                
                                                                                
                                                                                
                                                                                
                                                                                
                                                                                
                                                                                
                                                                                
                                                               



Encounters

                    





                    Location                            Location Details                            Encounter

 Type                            Encounter Number                            Reason For

 Visit                            Attending Provider                            ADM Date

                            DC Date                            Status                

                                        Source                    

 

                                        [facility]                       

                                                Physical/FBW                            [id number]

                                                                                      2016                                               

                                        [facility]                    

 

                                        [facility]                       

                                                Physical/FBW                            [id number]

                                                                                      2016                                               

                                        [facility]                    

 

                                        [facility]                       

                                                Physical/FBW                            [id number]

                                                                                      2016                                               

                                        [facility]                    

 

                                        [facility]                       

                                                Physical/FBW                            [id number]

                                                                                      2016                                               

                                        [facility]                    

 

                                        [facility]                       

                                                Physical/FBW                            [id number]

                                                                                      2016                                               

                                        [facility]                    

 

                                        [facility]                       

                                                Physical/FBW                            [id number]

                                                                                      2016                                               

                                        [facility]                    

 

                                        [facility]                       

                                                Physical/FBW                            [id number]

                                                                                      2016                                               

                                        [facility]                    

 

                                        [facility]                       

                                                1 month follow up                            [id number]

                                                                                      10/10/2016

                            10/10/2016                                               

                                        [facility]                    

 

                                        [facility]                       

                                                1 month follow up                            [id number]

                                                                                      10/10/2016

                            10/10/2016                                               

                                        [facility]                    

 

                                        [facility]                       

                                                1 month follow up                            [id number]

                                                                                      10/10/2016

                            10/10/2016                                               

                                        [facility]                    

 

                                        [facility]                       

                                                1 month follow up                            [id number]

                                                                                      10/10/2016

                            10/10/2016                                               

                                        [facility]                    

 

                                        [facility]                       

                                                1 month follow up                            [id number]

                                                                                      10/10/2016

                            10/10/2016                                               

                                        [facility]                    

 

                                        [facility]                       

                                                1 month follow up                            [id number]

                                                                                      10/10/2016

                            10/10/2016                                               

                                        [facility]                    

 

                                        [facility]                       

                                                Unknown                            [id number]

                                                                                      2016                                               

                                        [facility]                    

 

                                        [facility]                       

                                                Unknown                            [id number]

                                                                                      2016                                               

                                        [facility]                    

 

                                        [facility]                       

                                                Unknown                            [id number]

                                                                                      2016                                               

                                        [facility]                    

 

                                        [facility]                       

                                                Unknown                            [id number]

                                                                                      2016                                               

                                        [facility]                    

 

                                        [facility]                       

                                                Unknown                            [id number]

                                                                                      2016                                               

                                        [facility]                    

 

                                        [facility]                       

                                                Unknown                            [id number]

                                                                                      2016                                               

                                        [facility]                    

 

                                        [facility]                       

                                                Unknown                            [id number]

                                                                                      2016                                               

                                        [facility]                    

 

                                        [facility]                       

                                                Unknown                            [id number]

                                                                                      2016                                               

                                        [facility]                    

 

                                        [facility]                       

                                                Unknown                            [id number]

                                                                                      2016                                               

                                        [facility]                    

 

                                        [facility]                       

                                                3 MONTH FOLLOW UP                            [id number]

                                                                                      01/10/2017

                            01/10/2017                                               

                                        [facility]                    

 

                                        [facility]                       

                                                3 MONTH FOLLOW UP                            [id number]

                                                                                      01/10/2017

                            01/10/2017                                               

                                        [facility]                    

 

                                        [facility]                       

                                                3 MONTH FOLLOW UP                            [id number]

                                                                                      01/10/2017

                            01/10/2017                                               

                                        [facility]                    

 

                                        [facility]                       

                                                Test results                            [id number]

                                                                                      2017                                               

                                        [facility]                    

 

                                        [facility]                       

                                                Test results                            [id number]

                                                                                      2017                                               

                                        [facility]                    

 

                                        [facility]                       

                                                Unknown                            [id number]

                                                                                      2017                                               

                                        [facility]                    



                                                                                
                                                                                
                                                                                
                                                                                
                                                                        



Procedures

        





                                        No Data Provided for This Section



                                                    



Assessment and Plan

                    





                                        No Data Provided for This Section                    



                                     



Plan of Care







                                        No Data Provided for This Section                    



                                                                



Social History

                    





                    Social History                            Date                            Source    

                

 

                                        Social History ElementQualifiersDate Reported

Occupation:

                                        .  Homemaker

Rhett 10, 2017

children

                                        .  1

Rhett 10, 2017

Ethnicity

                                        .  Status , Is english your primary language? Yes

Rhett 10, 2017

Tobacco Use:

                                        .  Are you a: never smoker

Rhett 10, 2017

Flu Vaccine:

                                        .  never, Refuse

Rhett 10, 2017

Use of recreational / street drugs?

                                        .  Answer: No

Rhett 10, 2017

Do you have pets?

                                        .  Status: Yes, Type: cat(s)

Rhett 10, 2017

Where or with whom do you live ?

                                        .  family

Rhett 10, 2017

Marital Status:

.  Osmar

Rhett 10, 2017

Caffeine intake?

                                        .  Status: Yes, What type: Tea

Rhett 10, 2017

Do you exercise?

                                        .  Answer: Yes, Type: walking

Rhett 10, 2017

Smoke Exposure:

                                        .  Second Hand Smoke Exposure: No

Rhett 10, 2017

Do you drink alcohol?

                                        .  Status: No

Rhett 10, 2017

                            01/10/2017                            [facility] 

Internal [facility]                    



                                                                        



Family History

                    





                    Value                            Date                            Source             

       

 

                                        QualifierDescriptionCommentDate Reported

Maternal Grandmother

alive

myocardial infarction, Gallbladder Disease

Aug 25, 2016

Paternal Grandmother

Comment not available

Aug 25, 2016

Siblings

Comment not available

Aug 25, 2016

Maternal Grandfather

Comment not available

Aug 25, 2016

Children

Comment not available

Aug 25, 2016

Father



heart disease, myocardial infarction, depression, type II diabetes, allergy

Aug 25, 2016

Paternal Grandfather

Comment not available

Aug 25, 2016

Mother

alive

hypertension

Aug 25, 2016

Other:

Comment not available

Aug 25, 2016

                            2016                            [facility] 

Internal [facility]                    



                                                                    



Advance Directives

                    





                                        No Data Provided for This Section                    



                                                            



Functional Status

                    





                                        No Data Provided for This Section

## 2019-07-16 NOTE — XMS REPORT
[facility]

                             Created on: 02/15/2019



Aure Quinones

External Reference #: [id number]

: 1975

Sex: Female



Demographics







                          Address                   [address] [place] [address]  [address]

 

                          Home Phone                [phone]

 

                          Preferred Language        Unknown

 

                          Marital Status            Unknown

 

                          Sikh Affiliation     Unknown

 

                          Race                      Unknown

 

                          Ethnic Group              UNK





Author







                          Author                    Yoly Lanza

 

                          [facility]              eClinicalWorks

 

                          Address                   Unknown

 

                          Phone                     Unavailable







Care Team Providers







                    Care Team Member Name    Role                Phone

 

                    Yoly Lanza    CP                  Unavailable



                                                                



Allergies, Adverse Reactions, Alerts

          





                    Substance           Reaction            Event Type

 

                    sulfa               hives               Drug Allergy



                                                                                
       



Problems

          





             Problem Type    Condition    Code         Onset Dates    Condition Status

 

             Problem      Elevated LFTs    R94.5                     Active

 

             Problem      Hyperglycemia    R73.9                     Active

 

             Problem      Other obesity due to excess calories    E66.09                    Active

 

             Problem      Body mass index (BMI) of 30.0-30.9 in adult    Z68.30                    Active

 

             Problem      BMI 45.0-49.9, adult    Z68.42                    Active

 

             Problem      Depression    F32.9                     Active

 

             Problem      ADHD (attention deficit hyperactivity disorder)    F90.9                     Active

 

             Problem      Mixed hyperlipidemia    E78.2                     Active

 

             Problem      Vitamin D deficiency    E55.9                     Active

 

             Assessment    Vitamin D deficiency    E55.9                     Active

 

             Assessment    Elevated LFTs    R94.5                     Active

 

             Assessment    BMI 45.0-49.9, adult    Z68.42                    Active

 

             Assessment    Depression    F32.9                     Active

 

             Assessment    ADHD (attention deficit hyperactivity disorder)    F90.9                     Active

 

             Assessment    Hyperglycemia    R73.9                     Active

 

             Assessment    Screening for malignant neoplasm of breast    Z12.39                    Active

 

             Assessment    Mixed hyperlipidemia    E78.2                     Active

 

                    Assessment          Routine general medical examination at a health care facility    Z00.00

                                                    Active



                                                                                
                                                                                
                                                                                
               



Medications

          





        Medication    Code System    Code    Instructions    Start Date    End Date    Status    Dosage



 

        Lovaza    NDC     77972889225    1 GM Orally Twice a day            2020    Active    2 capsules



 

        Zyrtec Allergy    NDC     75516858170    10 MG Orally Once a day                    Active    1 tablet



 

        Abilify    NDC     32335376323    10 MG Orally Once a day                    Active    1 tablet

 

        Pristiq    NDC     13626467473    50 MG Orally Once a day                    Active    1 tablet

 

        Vyvanse    NDC     67020363250    10 MG Orally Once a day                    Active    1 capsule in the

 morning

 

        Mirena    NDC     38122996094    20 MCG/24HR Intrauterine     2016            Active    not

 defined

 

        Trintellix    NDC     95122711551    10 MG Orally Once a day                    Active    1 tablet

 

        Naproxen    NDC     63087449137    500 mg Oral as needed (prn)                    Active    TAKE 1 TABLET

 BY MOUTH TWICE A DAY WITH MEALS

 

          Fenofibrate    NDC       30911516281    160 MG Orally Once a day    2017              Active

                                        1 tablet with a meal



                                                                                
                                                                                
                



Vital Signs

          





                          Date/Time:                2019

 

                          BMI                       46.09 Index

 

                          Weight                    252 lbs

 

                          Height                    62 in

 

                          Cardiac Monitoring Heart Rate    68 /min

 

                          Blood Pressure Diastolic    80 mm Hg

 

                          Blood Pressure Systolic    124 mm Hg



                                                                    



Results

          





           Name       Result     Date       Reference Range    Unit       Abnormality Flag

 

           TSH                                                     

 

           ----TSH    2.340      2019    0.450-4.500     uIU/mL      

 

           CBC With Differential/Platelet                                                 

 

           ----Basos    0          2019    Not Estab.     %           

 

           ----MCV    85         36612464    79-97      fL          

 

           ----Hematocrit    40.2       2019    34.0-46.6     %           

 

           ----Eos    2          2019    Not Estab.     %           

 

           ----MCHC    32.8       2019    31.5-35.7     g/dL        

 

           ----Monocytes    5          2019    Not Estab.     %           

 

           ----MCH    28.0       2019    26.6-33.0     pg          

 

           ----Lymphs    49         16676994    Not Estab.     %           

 

           ----Eos (Absolute)    0.1        08456965    0.0-0.4     x10E3/uL     

 

           ----WBC    7.4        52965922    3.4-10.8     x10E3/uL     

 

           ----Monocytes(Absolute)    0.3        35399995    0.1-0.9     x10E3/uL     

 

           ----Lymphs (Absolute)    3.7        09967642    0.7-3.1     x10E3/uL    H

 

           ----Hemoglobin    13.2       2019    11.1-15.9     g/dL        

 

           ----Neutrophils (Absolute)    3.3        00300336    1.4-7.0     x10E3/uL     

 

           ----RBC    4.71       97894506    3.77-5.28     x10E6/uL     

 

           ----Immature Grans (Abs)    0.0        53271475    0.0-0.1     x10E3/uL     

 

           ----Immature Granulocytes    0          29741238    Not Estab.     %           

 

           ----Neutrophils    44         79705718    Not Estab.     %           

 

           ----Baso (Absolute)    0.0        74193102    0.0-0.2     x10E3/uL     

 

           ----RDW    13.8       76038353    12.3-15.4     %           

 

           ----Platelets    245        94816943    150-379     x10E3/uL     

 

           Hemoglobin A1c                                                 

 

           ----Hemoglobin A1c    6.2        61168268    4.8-5.6     %          H

 

           Urinalysis, Routine                                                 

 

           ----Glucose    Negative    72398992    Negative                 

 

           ----Protein    Negative    07857583    Negative/Trace                 

 

           ----Occult Blood    Negative    56399783    Negative                 

 

           ----Ketones    Negative    43703264    Negative                 

 

           ----Urobilinogen,Semi-Qn    0.2        58376288    0.2-1.0     mg/dL       

 

           ----Nitrite, Urine    Negative    33773497    Negative                 

 

           ----Bilirubin    Negative    78834284    Negative                 

 

           ----Appearance    Clear      2019    Clear                  

 

           ----WBC Esterase    Negative    2019    Negative                 

 

           ----pH     5.5        02488471    5.0-7.5                 

 

           ----Microscopic Examination    Comment    2019                           

 

           ----Urine-Color    Yellow     2019    Yellow                 

 

           ----Specific Gravity    1.019      84022979    1.005-1.030                 

 

           Lipid Panel                                                 

 

           ----LDL Cholesterol Calc    122        22501830    0-99       mg/dL      H

 

           ----VLDL Cholesterol Luis Miguel    51         36258001    5-40       mg/dL      H

 

           ----HDL Cholesterol    37         48720790    >39        mg/dL      L

 

           ----Triglycerides    257        11143155    0-149      mg/dL      H

 

           ----Cholesterol, Total    210        96279633    100-199     mg/dL      H

 

           Comp. Metabolic Panel (14)                                                 

 

           ----Creatinine    0.71       40280179    0.57-1.00     mg/dL       

 

           ----BUN    10         06165445    6-24       mg/dL       

 

           ----eGFR If Africn Am    121        90868586        >59     mL/min/1.73     

 

           ----eGFR If NonAfricn Am    105        19797153        >59     mL/min/1.73     

 

           ----Sodium    139        11487495    134-144     mmol/L      

 

           ----BUN/Creatinine Ratio    14         2019    9-23                   

 

           ----Chloride    98         42355760         mmol/L      

 

           ----Potassium    4.5        53972660    3.5-5.2     mmol/L      

 

           ----Carbon Dioxide, Total    25         32585896    20-29      mmol/L      

 

           ----Protein, Total    6.9        2019    6.0-8.5     g/dL        

 

           ----Calcium    9.8        2019    8.7-10.2     mg/dL       

 

           ----Globulin, Total    2.5        2019    1.5-4.5     g/dL        

 

           ----Albumin    4.4        2019    3.5-5.5     g/dL        

 

           ----Bilirubin, Total    0.4        2019    0.0-1.2     mg/dL       

 

           ----Glucose    111        2019    65-99      mg/dL      H

 

           ----A/G Ratio    1.8        2019    1.2-2.2                 

 

           ----ALT (SGPT)    39         2019    0-32       IU/L       H

 

           ----Alkaline Phosphatase    80         84355136         IU/L        

 

           ----AST (SGOT)    32         2019    0-40       IU/L        



                                                                                
                                     



Summary Purpose

          eClinicalWorks Submission

## 2019-07-16 NOTE — XMS REPORT
[facility]

                             Created on: 2017



Aure Quinones

External Reference #: [id number]

: 1975

Sex: Female



Demographics







                          Address                   [address] [place] [address]  [address]

 

                          Home Phone                [phone]

 

                          Preferred Language        Unknown

 

                          Marital Status            Unknown

 

                          Protestant Affiliation     Unknown

 

                          Race                      Unknown

 

                          Ethnic Group              Non-





Author







                          Author                    Yoly Lanza

 

                          [facility]              eClinicalWorks

 

                          Address                   Unknown

 

                          Phone                     Unavailable







Care Team Providers







                    Care Team Member Name    Role                Phone

 

                    Yoly Lanza    CP                  Unavailable



                                            



Encounters

          





                    Encounter           Location            Date

 

                    Unknown             [facility]    2016



 

                    3 MONTH FOLLOW UP    [facility]    [place]

 10, 2017

 

                    Test results        [facility]    2017

 

                    Unknown             [facility]    2017



 

                    Physical/FBW        [facility]    Aug 25,

 2016

 

                    1 month follow up     [facility]    Oct

 10, 2016

 

                    Unknown             [facility]    2016





                                                                                
                                                                   



Problems

          





             Problem Type    Condition    ICD-9 Code    Onset Dates    Condition Status

 

             Problem      Prediabetes    R73.09                    Active

 

             Problem      Hypothyroid    E03.9                     Active

 

             Problem      Mixed hyperlipidemia    E78.2                     Active

 

             Problem      ADHD (attention deficit hyperactivity disorder)    F90.9                     Active

 

             Problem      Vitamin D deficiency    E55.9                     Active

 

             Problem      Depression    F32.9                     Active



                                                                                
                                                         



Medications

          





        Medication    Code System    Code    Instructions    Start Date    End Date    Status    Dosage



 

           Fenofibrate    MEDISPAN    [address]-63    160 MG Orally Once a day    2017      

                          Active                    1 tablet with a meal

 

           Fenofibrate    MEDISPAN    [address]-33    145 MG Orally Once a day    2017      

                          Inactive                  1 tablet



                                                                                
                 



Social History

          





                    Social History Element    Qualifiers          Date Reported

 

                    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

 

                    children            .  1                Rhett 10, 2017

 

                    Ethnicity           .  Status , Is english your primary language? Yes    Rhett 10, 2017



 

                    Tobacco Use:        .  Are you a: never smoker    Rhett 10, 2017

 

                    Flu Vaccine:        .  never, Refuse    Rhett 10, 2017

 

                    Use of recreational / street drugs?    .  Answer: No       Rhett 10, 2017

 

                    Do you have pets?    .  Status: Yes, Type: cat(s)    Rhett 10, 2017

 

                    Where or with whom do you live ?    .  family           Rhett 10, 2017

 

                    Marital Status:     .  Osmar    Rhett 10, 2017

 

                    Caffeine intake?    .  Status: Yes, What type: Tea    Rhett 10, 2017

 

                    Do you exercise?    .  Answer: Yes, Type: walking    Rhett 10, 2017

 

                    Smoke Exposure:     .  Second Hand Smoke Exposure: No    Rhett 10, 2017

 

                    Do you drink alcohol?    .  Status: No       Rhett 10, 2017



                                                                                
                                                                                
                          



Summary Purpose

          eClinicalWorks Submission

## 2019-07-16 NOTE — XMS REPORT
[facility]

                             Created on: 2017



Aure Quinones

External Reference #: [id number]

: 1975

Sex: Female



Demographics







                          Address                   [address] [place] [address]  [address]

 

                          Home Phone                [phone]

 

                          Preferred Language        Unknown

 

                          Marital Status            Unknown

 

                          Muslim Affiliation     Unknown

 

                          Race                      Unknown

 

                          Ethnic Group              Non-





Author







                          Author                    Yoly Lanza

 

                          [facility]              eClinicalWorks

 

                          Address                   Unknown

 

                          Phone                     Unavailable







Care Team Providers







                    Care Team Member Name    Role                Phone

 

                    Yoly Lanza    CP                  Unavailable



                                                                



Allergies, Adverse Reactions, Alerts

          





                    Substance           Reaction            Event Type

 

                    sulfa               rash                Drug Allergy



                                                                    



Encounters

          





                    Encounter           Location            Date

 

                    Unknown             [facility]    2016



 

                    3 MONTH FOLLOW UP    [facility]    [place]

 10, 2017

 

                    Physical/FBW        [facility]    Aug 25,

 2016

 

                    1 month follow up     [facility]    Oct

 10, 2016

 

                    Unknown             [facility]    2016





                                                                                
                                               



Problems

          





             Problem Type    Condition    ICD-9 Code    Onset Dates    Condition Status

 

             Assessment    Hypothyroid    E03.9                     Active

 

             Assessment    Mixed hyperlipidemia    E78.2                     Active

 

             Assessment    Vitamin D deficiency    E55.9                     Active

 

             Assessment    Depression    F32.9                     Active

 

             Problem      Prediabetes    R73.09                    Active

 

             Problem      Hypothyroid    E03.9                     Active

 

             Problem      Mixed hyperlipidemia    E78.2                     Active

 

             Problem      ADHD (attention deficit hyperactivity disorder)    F90.9                     Active

 

             Assessment    Prediabetes    R73.09                    Active

 

             Problem      Vitamin D deficiency    E55.9                     Active

 

             Problem      Depression    F32.9                     Active



                                                                                
                                                                                
                          



Medications

          





        Medication    Code System    Code    Instructions    Start Date    End Date    Status    Dosage



 

        Mucinex    [facility]    [address]-65    600 MG Orally every 12 hrs                    Inactive    1 

tablet as needed

 

        Pristiq    [facility]    [address]-63    50 MG Orally Once a day                    Active    1 tablet



 

        Lexapro    [facility]    [address]-25    20 MG Orally Once a day                    Active    0.5 tablet



 

        Vyvanse    [facility]    [address]-10    10 MG Orally Once a day                    Active    1 capsule

 in the morning

 

          Lovaza    [facility]    [address]-38    1 GM Orally Twice a day              Oct 05, 2017    Active

                                        2 capsules

 

                Vitamin D (Ergocalciferol)    [facility]        [address]-73    61113 UNIT Orally once per 

week            Aug 25, 2016    2017    Active          1 capsule

 

        Zyrtec Allergy    [facility]    [address]-97    10 MG Orally Once a day                    Active    

1 tablet

 

        Mirena    [facility]    [address]-95    20 MCG/24HR Intrauterine                     Active    Unknown



 

          Trilipix    [facility]    [address]-15    135 MG Orally Once a day    Nov 10, 2016              Active

                                        1 capsule

 

        Abilify    [facility]    [address]-51    10 MG Orally Once a day                    Active    1 tablet





                                                                                
                                                                                
                



Social History

          





                    Social History Element    Qualifiers          Date Reported

 

                    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

 

                    children            .  1                [place] 10, 2017

 

                    Ethnicity           .  Status , Is english your primary language? Yes    [place] 10, 2017



 

                    Tobacco Use:        .  Are you a: never smoker    [place] 10, 2017

 

                    Flu Vaccine:        .  never, Refuse    [place] 10, 2017

 

                    Use of recreational / street drugs?    .  Answer: No       [place] 10, 2017

 

                    Do you have pets?    .  Status: Yes, Type: cat(s)    [place] 10, 2017

 

                    Where or with whom do you live ?    .  family           [place] 10, 2017

 

                    Marital Status:     .  Osmar    [place] 10, 2017

 

                    Caffeine intake?    .  Status: Yes, What type: Tea    [place] 10, 2017

 

                    Do you exercise?    .  Answer: Yes, Type: walking    [place] 10, 2017

 

                    Smoke Exposure:     .  Second Hand Smoke Exposure: No    [place] 10, 2017

 

                    Do you drink alcohol?    .  Status: No       [place] 10, 2017



                                                                                
                                                                                
                                                        



Vital Signs

          





                          Date/Time:                [place] 10, 2017

 

                          Weight                    230 lbs

 

                          Height                    62 in

 

                          Cardiac Monitoring Heart Rate    63 /min

 

                          Blood Pressure Diastolic    84 mm Hg

 

                          Blood Pressure Systolic    118 mm Hg



                                                                    



Results

          





                                         

 

                                        Hemoglobin A1c



                                                                    



Summary Purpose

          eClinicalWorks Submission

## 2019-07-16 NOTE — XMS REPORT
Clinical Summary

                             Created on: 2019



Aure Quinones

External Reference #: [id number]

: 1975

Sex: Female



Demographics







                          Address                   [address] [place]  [address]

 

                          Home Phone                [phone]

 

                          Preferred Language        English

 

                          Marital Status            

 

                          Mormon Affiliation     [id number]

 

                          Race                      White

 

                          Ethnic Group              Non-





Author







                          Author                    [facility]

 

                          Organization              [facility]

 

                          Address                   Unknown

 

                          Phone                     Unavailable







Support







                Name            Relationship    Address         Phone

 

                Osmar Quinones    [employer]            Unknown         [phone]







Care Team Providers







                    Care Team Member Name    Role                Phone

 

                    Provider, Unknown    PCP                 Unavailable







Allergies

Not on File



Medications

Not on file



Active Problems





Not on file



Social History







                                        Date



                 Tobacco Use     Types           Packs/Day       Years Used 

 

                                         



                                         Never Assessed    









 



                           Sex Assigned at Birth     Date Recorded

 

 



                                         Not on file 









                                        Industry



                           Job Start Date            Occupation 

 

                                        Not on file



                           Not on file               Not on file 









                                        Travel End



                           Travel History            Travel Start 

 





                                         No recent travel history available.







Last Filed Vital Signs

Not on file



Plan of Treatment







   



                 Health Maintenance     Due Date        Last Done       Comments

 

   



                           INFLUENZA VACCINE         2019  







Results

Not on fileafter 07/15/2018



Insurance







                                        Type



            Payer      Benefit     Subscriber ID     Effective     Phone      Address 



                           Plan /                    Dates   



                                         Group     

 

                                        PPO



                 BCBS            BCBS            xxxxxxxxxxxx     2017-P   



                           CHOICE                    resent   



                                         PPO/GILMA     



                                         L EMPL PPO     









     



            Guarantor Name     Account     Relation to     Date of     Phone      Billing Address



                     Type                Patient             Birth  

 

     



            Aure Quinones     Personal/F     Self       1975     [phone]     [address]



                     amily               (Home)              [place] [address]







Advance Directives





Patient has advance care planning documents on file. For more information, lauren pang contact:



[facility]



[address] [place] [address]

## 2019-07-16 NOTE — XMS REPORT
[facility]

                             Created on: 2016



Aure Quinones

External Reference #: [id number]

: 1975

Sex: Female



Demographics







                          Address                   [address] [place] [address]  [address]

 

                          Home Phone                [phone]

 

                          Preferred Language        Unknown

 

                          Marital Status            Unknown

 

                          Scientologist Affiliation     Unknown

 

                          Race                      Unknown

 

                          Ethnic Group              Non-





Author







                          Author                    Yoly Lanza

 

                          [facility]              eClinicalWorks

 

                          Address                   Unknown

 

                          Phone                     Unavailable







Care Team Providers







                    Care Team Member Name    Role                Phone

 

                    Yoly Lanza    CP                  Unavailable



                                                                



Allergies, Adverse Reactions, Alerts

          





                    Substance           Reaction            Event Type

 

                    sulfa               rash                Drug Allergy



                                                                    



Encounters

          





                    Encounter           Location            Date

 

                    Physical/FBW        [facility]    Aug 25,

 2016



                                                                                
       



Problems

          





             Problem Type    Condition    ICD-9 Code    Onset Dates    Condition Status

 

             Assessment    Hyperlipidemia    E78.5                     Active

 

             Assessment    Prediabetes    R73.09                    Active

 

             Assessment    Hypothyroid    E03.9                     Active

 

             Problem      Hypothyroid    E03.9                     Active

 

             Problem      Hyperlipidemia    E78.5                     Active

 

             Problem      Prediabetes    R73.09                    Active

 

             Problem      ADHD (attention deficit hyperactivity disorder)    F90.9                     Active

 

                    Assessment          Routine general medical examination at a health care facility    Z00.00

                                                    Active

 

             Problem      Vitamin D deficiency    E55.9                     Active

 

             Problem      Depression    F32.9                     Active

 

             Assessment    BMI 40.0-44.9, adult    Z68.41                    Active

 

             Assessment    ADHD (attention deficit hyperactivity disorder)    F90.9                     Active

 

             Assessment    Tendonitis of elbow, left    M77.8                     Active

 

             Assessment    Depression    F32.9                     Active

 

             Assessment    Severe obesity (BMI >=40)    E66.01                    Active

 

             Assessment    Vitamin D deficiency    E55.9                     Active



                                                                                
                                                                                
                                                                            



Medications

          





        Medication    Code System    Code    Instructions    Start Date    End Date    Status    Dosage



 

        Antara    [facility]    [address]-01    43 MG Orally Once a day                    Active    1 capsule

 with a meal

 

        Lexapro    [facility]    [address]-54    20 MG Orally Once a day                    Active    0.5 tablet



 

                Vitamin D (Ergocalciferol)    [facility]        [address]-92    83949 UNIT Orally once per 

week            Aug 25, 2016    2016    Active          1 capsule

 

        Vyvanse    [facility]    [address]-10    10 MG Orally Once a day                    Active    1 capsule

 in the morning

 

        Zyrtec Allergy    [facility]    [address]-72    10 MG Orally Once a day                    Active    

1 tablet

 

        Lovaza    [facility]    [address]-07    1 GM Orally Twice a day                    Active    2 capsules



 

        Mirena    [facility]    [address]-00    20 MCG/24HR Intrauterine                     Active    Unknown



 

        Abilify    [facility]    [address]-14    10 MG Orally Once a day                    Active    1 tablet



 

        Mucinex    [facility]    [address]-11    600 MG Orally every 12 hrs                    Active    1 tablet

 as needed

 

                Mucinex DM Maximum Strength    [facility]        [address]-03     MG Orally Twice a 

day                                             Active          1 tablet as needed



                                                                                
                                                                                
                



Social History

          





                    Social History Element    Qualifiers          Date Reported

 

                    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

 

                    children            .  1                Aug 25, 2016

 

                    Ethnicity           .  Status , Is english your primary language? Yes    Aug 25, 2016



 

                    Tobacco Use:        .  Are you a: never smoker    Aug 25, 2016

 

                    Flu Vaccine:        .  never, Refuse    Aug 25, 2016

 

                    Use of recreational / street drugs?    .  Answer: No       Aug 25, 2016

 

                    Do you have pets?    .  Status: Yes, Type: cat(s)    Aug 25, 2016

 

                    Where or with whom do you live ?    .  family           Aug 25, 2016

 

                    Marital Status:     .  Osmar    Aug 25, 2016

 

                    Caffeine intake?    .  Status: Yes, What type: Tea    Aug 25, 2016

 

                    Do you exercise?    .  Answer: Yes, Type: walking    Aug 25, 2016

 

                    Depression Screening:    .  positive         Aug 25, 2016

 

                    Smoke Exposure:     .  Second Hand Smoke Exposure: No    Aug 25, 2016

 

                    Do you drink alcohol?    .  Status: No       Aug 25, 2016



                                                                                
                                                                                
                                                                  



Family history

          





                Qualifier       Description     Comment         Date Reported

 

                Maternal Grandmother    alive           myocardial infarction, Gallbladder Disease    Aug 25,

 2016

 

                Paternal Grandmother                    Comment not available    Aug 25, 2016

 

                Siblings                        Comment not available    Aug 25, 2016

 

                Maternal Grandfather                    Comment not available    Aug 25, 2016

 

                Children                        Comment not available    Aug 25, 2016

 

                    Father                          heart disease, myocardial infarction, depression, type II diabetes,

 allergy                                Aug 25, 2016

 

                Paternal Grandfather                    Comment not available    Aug 25, 2016

 

                Mother          alive           hypertension    Aug 25, 2016

 

                Other:                          Comment not available    Aug 25, 2016



                                                                                
                                                                                
                



Vital Signs

          





                          Date/Time:                Aug 25, 2016

 

                          Weight                    235 lbs

 

                          Height                    62 in

 

                          Cardiac Monitoring Heart Rate    74 /min

 

                          Blood Pressure Diastolic    82 mm Hg

 

                          Blood Pressure Systolic    129 mm Hg



                                                                                
       



Immunizations

          





                          Vaccine                   Administration Date

 

                          FLU 18 YRS & UP 36981     Aug 25, 2016



                                                                    



Summary Purpose

          eClinicalWorks Submission

## 2019-07-16 NOTE — XMS REPORT
[facility]

                             Created on: 2019



Aure Quinones

External Reference #: [id number]

: 1975

Sex: Female



Demographics







                          Address                   [address] [place] [address]  [address]

 

                          Home Phone                [phone]

 

                          Preferred Language        Unknown

 

                          Marital Status            Unknown

 

                          [employer] Affiliation     Unknown

 

                          Race                      Unknown

 

                          Ethnic Group              UNK





Author







                          Author                    Yoly Lanza

 

                          [facility] [place]              [employer]

 

                          Address                   Unknown

 

                          Phone                     Unavailable







Care Team Providers







                    Care Team Member Name    Role                Phone

 

                    Yoly Lanza    CP                  Unavailable



                                                                



Allergies, Adverse Reactions, Alerts

          





                    Substance           Reaction            Event Type

 

                    sulfa               hives               Drug Allergy



                                                                                
       



Problems

          





             Problem Type    Condition    Code         Onset Dates    Condition Status

 

             Problem      Elevated LFTs    R94.5                     Active

 

             Problem      ADHD (attention deficit hyperactivity disorder)    F90.9                     Active

 

             Problem      Hyperglycemia    R73.9                     Active

 

             Assessment    Vitamin D deficiency    E55.9                     Active

 

             Assessment    Morbid obesity    E66.01                    Active

 

             Assessment    Mixed hyperlipidemia    E78.2                     Active

 

             Assessment    Depression    F32.9                     Active

 

             Problem      BMI 45.0-49.9, adult    Z68.42                    Active

 

             Problem      Other obesity due to excess calories    E66.09                    Active

 

             Problem      Morbid obesity    E66.01                    Active

 

             Problem      Vitamin D deficiency    E55.9                     Active

 

             Problem      Depression    F32.9                     Active

 

             Problem      Body mass index (BMI) of 30.0-30.9 in adult    Z68.30                    Active

 

             Problem      Mixed hyperlipidemia    E78.2                     Active



                                                                                
                                                                                
                                                        



Medications

          





        Medication    Code System    Code    Instructions    Start Date    End Date    Status    Dosage



 

        Mirena    NDC     00436979915    20 MCG/24HR Intrauterine     2016            Active    not

 defined

 

        Naproxen    NDC     43509307653    500 mg Oral as needed (prn)                    Active    TAKE 1 TABLET

 BY MOUTH TWICE A DAY WITH MEALS

 

          Fenofibrate    NDC       16292971867    160 MG Orally Once a day    2017              Active

                                        1 tablet with a meal

 

             Vitamin D (Ergocalciferol)    NDC          88162225398    69704 UNIT Orally once per week    Feb

 19, 2019           Aug 18, 2019        Active              1 capsule

 

        Trintellix    NDC     33271177630    10 MG Orally Once a day                    Active    1 tablet

 

        Zyrtec Allergy    ND     05712384837    10 MG Orally Once a day                    Active    1 tablet



 

        Abilify    NDC     19688010000    10 MG Orally Once a day                    Active    1 tablet

 

        Lovaza    ND     11145393923    1 GM Orally Twice a day            2020    Active    2 capsules





                                                                                
                                                                                
      



Vital Signs

          





                          Date/Time:                May 21, 2019

 

                          BMI                       42.06 Index

 

                          Weight                    230 lbs

 

                          Height                    62 in

 

                          Cardiac Monitoring Heart Rate    68 /min

 

                          Blood Pressure Diastolic    80 mm Hg

 

                          Blood Pressure Systolic    126 mm Hg



                                                                              



Results

          No Known Results                                                      
             



Summary Purpose

          [employer] Submission

## 2019-07-16 NOTE — XMS REPORT
[facility]

                             Created on: 2019



Aure Quinones

External Reference #: [id number]

: 1975

Sex: Female



Demographics







                          Address                   [address] [place] [address]  [address]

 

                          Home Phone                [phone]

 

                          Preferred Language        Unknown

 

                          Marital Status            Unknown

 

                          [employer]olic Affiliation     Unknown

 

                          Race                      Unknown

 

                          Ethnic Group              UNK





Author







                          Author                    Yoly Lanza

 

                          Organization              [employer]

 

                          Address                   Unknown

 

                          Phone                     Unavailable







Care Team Providers







                    Care Team Member Name    Role                Phone

 

                    Yoly Lanza    CP                  Unavailable



                                                                



Allergies

          No Known Allergies                                                    
                                   



Problems

          





             Problem Type    Condition    Code         Onset Dates    Condition Status

 

             Problem      Elevated LFTs    R94.5                     Active

 

             Problem      ADHD (attention deficit hyperactivity disorder)    F90.9                     Active

 

             Problem      Hyperglycemia    R73.9                     Active

 

             Assessment    Elevated LFTs    R94.5                     Active

 

             Problem      BMI 45.0-49.9, adult    Z68.42                    Active

 

             Problem      Other obesity due to excess calories    E66.09                    Active

 

             Problem      Morbid obesity    E66.01                    Active

 

             Problem      Vitamin D deficiency    E55.9                     Active

 

             Problem      Depression    F32.9                     Active

 

             Problem      Body mass index (BMI) of 30.0-30.9 in adult    Z68.30                    Active

 

             Problem      Mixed hyperlipidemia    E78.2                     Active



                                                                                
                                                                                
                          



Medications

          





        Medication    Code System    Code    Instructions    Start Date    End Date    Status    Dosage



 

        Trintellix    NDC     37368097676    10 MG Orally Once a day                    Active    1 tablet

 

             Vitamin D (Ergocalciferol)    NDC          07143075514    22564 UNIT Orally once per week    Feb

 19, 2019           Aug 18, 2019        Active              1 capsule

 

        Mirena    NDC     62778320999    20 MCG/24HR Intrauterine     2016            Active    not

 defined

 

        Abilify    NDC     57939494680    10 MG Orally Once a day                    Active    1 tablet

 

        Lovaza    NDC     86531424573    1 GM Orally Twice a day            2020    Active    2 capsules



 

          Fenofibrate    NDC       16746917257    160 MG Orally Once a day    2017              Active

                                        1 tablet with a meal

 

        Naproxen    NDC     27366142081    500 mg Oral as needed (prn)                    Active    TAKE 1 TABLET

 BY MOUTH TWICE A DAY WITH MEALS

 

        Zyrtec Allergy    ND     43028812680    10 MG Orally Once a day                    Active    1 tablet





                                                                                
                                                                   



Results

          No Known Results                                                      
             



Summary Purpose

          [employer] Submission

## 2019-07-16 NOTE — XMS REPORT
[facility]

                             Created on: 07/15/2019



Aure Quinones

External Reference #: [id number]

: 1975

Sex: Female



Demographics







                          Address                   [address] [place] [address]  [address]

 

                          Home Phone                [phone]

 

                          Preferred Language        Unknown

 

                          Marital Status            Unknown

 

                          Jain Affiliation     Unknown

 

                          Race                      Unknown

 

                          Ethnic Group              UNK





Author







                          Author                    Yoly Lanza

 

                          Organization              [employer]

 

                          Address                   Unknown

 

                          Phone                     Unavailable







Care Team Providers







                    Care Team Member Name    Role                Phone

 

                    Yoly Lanza    CP                  Unavailable



                                                                



Allergies

          No Known Allergies                                                    
                                   



Problems

          





             Problem Type    Condition    Code         Onset Dates    Condition Status

 

             Problem      Elevated LFTs    R94.5                     Active

 

             Problem      ADHD (attention deficit hyperactivity disorder)    F90.9                     Active

 

             Problem      Hyperglycemia    R73.9                     Active

 

             Problem      BMI 45.0-49.9, adult    Z68.42                    Active

 

             Problem      Other obesity due to excess calories    E66.09                    Active

 

             Problem      Morbid obesity    E66.01                    Active

 

             Problem      Vitamin D deficiency    E55.9                     Active

 

             Problem      Depression    F32.9                     Active

 

             Problem      Body mass index (BMI) of 30.0-30.9 in adult    Z68.30                    Active

 

             Problem      Mixed hyperlipidemia    E78.2                     Active



                                                                                
                                                                                
                



Medications

          No Known Medications                                                  
                           



Results

          No Known Results                                                      
             



Summary Purpose

          [employer]Clinical[employer] Submission